# Patient Record
Sex: FEMALE | Race: WHITE | NOT HISPANIC OR LATINO
[De-identification: names, ages, dates, MRNs, and addresses within clinical notes are randomized per-mention and may not be internally consistent; named-entity substitution may affect disease eponyms.]

---

## 2021-05-26 ENCOUNTER — NON-APPOINTMENT (OUTPATIENT)
Age: 71
End: 2021-05-26

## 2021-05-28 ENCOUNTER — NON-APPOINTMENT (OUTPATIENT)
Age: 71
End: 2021-05-28

## 2021-06-01 ENCOUNTER — NON-APPOINTMENT (OUTPATIENT)
Age: 71
End: 2021-06-01

## 2021-06-02 ENCOUNTER — NON-APPOINTMENT (OUTPATIENT)
Age: 71
End: 2021-06-02

## 2021-06-02 ENCOUNTER — APPOINTMENT (OUTPATIENT)
Dept: BREAST CENTER | Facility: CLINIC | Age: 71
End: 2021-06-02
Payer: MEDICARE

## 2021-06-02 DIAGNOSIS — Z86.79 PERSONAL HISTORY OF OTHER DISEASES OF THE CIRCULATORY SYSTEM: ICD-10-CM

## 2021-06-02 DIAGNOSIS — Z87.39 PERSONAL HISTORY OF OTHER DISEASES OF THE MUSCULOSKELETAL SYSTEM AND CONNECTIVE TISSUE: ICD-10-CM

## 2021-06-02 DIAGNOSIS — Z86.69 PERSONAL HISTORY OF OTHER DISEASES OF THE NERVOUS SYSTEM AND SENSE ORGANS: ICD-10-CM

## 2021-06-02 DIAGNOSIS — Z87.891 PERSONAL HISTORY OF NICOTINE DEPENDENCE: ICD-10-CM

## 2021-06-02 DIAGNOSIS — Z78.9 OTHER SPECIFIED HEALTH STATUS: ICD-10-CM

## 2021-06-02 DIAGNOSIS — Z86.39 PERSONAL HISTORY OF OTHER ENDOCRINE, NUTRITIONAL AND METABOLIC DISEASE: ICD-10-CM

## 2021-06-02 DIAGNOSIS — H04.552 ACQUIRED STENOSIS OF LEFT NASOLACRIMAL DUCT: ICD-10-CM

## 2021-06-02 PROCEDURE — 99204 OFFICE O/P NEW MOD 45 MIN: CPT

## 2021-06-02 PROCEDURE — 76642 ULTRASOUND BREAST LIMITED: CPT

## 2021-06-02 RX ORDER — BENAZEPRIL HYDROCHLORIDE 5 MG/1
TABLET ORAL
Refills: 0 | Status: ACTIVE | COMMUNITY

## 2021-06-02 RX ORDER — AMLODIPINE BESYLATE 5 MG/1
5 TABLET ORAL
Refills: 0 | Status: ACTIVE | COMMUNITY

## 2021-06-02 RX ORDER — ROSUVASTATIN CALCIUM 10 MG/1
10 TABLET, FILM COATED ORAL
Refills: 0 | Status: ACTIVE | COMMUNITY

## 2021-06-02 RX ORDER — ASPIRIN 81 MG
81 TABLET, DELAYED RELEASE (ENTERIC COATED) ORAL
Refills: 0 | Status: ACTIVE | COMMUNITY

## 2021-06-02 RX ORDER — UBIDECARENONE/VIT E ACET 100MG-5
CAPSULE ORAL
Refills: 0 | Status: ACTIVE | COMMUNITY

## 2021-06-06 NOTE — ASSESSMENT
[FreeTextEntry1] : Right nipple discharge \par Given the benign appearing bedside ultrasound, discussed recommendation to follow up in 3 months for re-evaluation. Also discussed a trial of Salonpas for relief of burning sensation. \par Reviewed the indications to follow up sooner including if nipple discharge becomes clear or bloody in appearance

## 2021-06-06 NOTE — PAST MEDICAL HISTORY
[Menarche Age ____] : age at menarche was [unfilled] [Menopause Age____] : age at menopause was [unfilled] [Approximately ___] : the LMP was approximately [unfilled] [Total Preg ___] : G[unfilled] [Live Births ___] : P[unfilled]  [Living ___] : Living: [unfilled] [Age At Live Birth ___] : Age at live birth: [unfilled] [History of Hormone Replacement Treatment] : has no history of hormone replacement treatment [FreeTextEntry6] : No [FreeTextEntry7] : None [FreeTextEntry8] : 6-9 months and 9-12 months

## 2021-06-06 NOTE — DATA REVIEWED
[FreeTextEntry1] : 12/29/2020 (R) b/l screening mammogram showing scattered areas of fibroglandular density.\par No suspicious masses, architectural distortion, or significant calcifications are detected. Scattered morphologically benign appearing calcifications are present in both breasts. Benign findings. No mammographic evidence of malignancy. No significant change. Annual screening. BI-RADS Category 2: Benign.\par

## 2021-06-06 NOTE — PROCEDURE
[FreeTextEntry1] : targeted US of right breast [FreeTextEntry2] : right nipple discharge [FreeTextEntry3] : Technique: a targeted right breast ultrasound was performed with evaluation of the retroareolar region Findings: No suspicious solid or complex cystic masses were detected \par \par \par

## 2021-06-06 NOTE — REASON FOR VISIT
[Consultation] : a consultation visit [FreeTextEntry1] : spontaneous "watery white" right nipple discharge first noted in December 2020/January 2021 and burning sensation of right NAC

## 2021-06-06 NOTE — CONSULT LETTER
[Dear  ___] : Dear ~JOHN, [Consult Letter:] : I had the pleasure of evaluating your patient, [unfilled]. [Please see my note below.] : Please see my note below. [Consult Closing:] : Thank you very much for allowing me to participate in the care of this patient.  If you have any questions, please do not hesitate to contact me. [Sincerely,] : Sincerely, [FreeTextEntry3] : Denzel\par \par Denzel Ceballos MD\par Chief of Breast Surgery\par Director of Breast Cancer Program\par [facility]\par \par \par

## 2021-09-02 PROBLEM — Z00.00 ENCOUNTER FOR PREVENTIVE HEALTH EXAMINATION: Status: ACTIVE | Noted: 2021-05-26

## 2021-09-02 PROBLEM — N64.4 MASTALGIA: Status: ACTIVE | Noted: 2021-06-02

## 2021-09-08 ENCOUNTER — APPOINTMENT (OUTPATIENT)
Dept: BREAST CENTER | Facility: CLINIC | Age: 71
End: 2021-09-08
Payer: MEDICARE

## 2021-09-08 VITALS — OXYGEN SATURATION: 98 % | HEART RATE: 67 BPM | BODY MASS INDEX: 32.47 KG/M2 | WEIGHT: 172 LBS | HEIGHT: 61 IN

## 2021-09-08 DIAGNOSIS — Z00.00 ENCOUNTER FOR GENERAL ADULT MEDICAL EXAMINATION W/OUT ABNORMAL FINDINGS: ICD-10-CM

## 2021-09-08 DIAGNOSIS — N64.4 MASTODYNIA: ICD-10-CM

## 2021-09-08 PROCEDURE — 99213 OFFICE O/P EST LOW 20 MIN: CPT

## 2021-09-12 NOTE — ASSESSMENT
[FreeTextEntry1] : 74yo female LOV 6/2/21 presents for f/u evaluation of burning sensation of NAC first noticed in May 2021 following her breast exam with Dr. Manley. In office US at initial office visit did not show any concerning findings.  \par \par Patient to have diagnostic bilateral mammo and ultrasound now. If benign, she will return in 6 months for re-examination.

## 2021-09-12 NOTE — PHYSICAL EXAM
[Examined in the supine and seated position] : examined in the supine and seated position [Symmetrical] : symmetrical [No dominant masses] : no dominant masses in right breast  [No dominant masses] : no dominant masses left breast [No Nipple Retraction] : no left nipple retraction [No Nipple Discharge] : no left nipple discharge [No Axillary Lymphadenopathy] : no left axillary lymphadenopathy [de-identified] : Bilateral breast/axilla/supraclavicular area: No masses, discharge, or adenopathy\par

## 2021-09-12 NOTE — HISTORY OF PRESENT ILLNESS
[FreeTextEntry1] : 74yo female LOV 6/2/21 presents for f/u evaluation of burning sensation of NAC first noticed in May 2021 following her breast exam with Dr. Manley. In office US at initial office visit did not show any concerning findings.  \par \par At initial consultation, patient reported spontaneous "watery white" right nipple discharge first noted in December 2020/January 2021, continuous.  She has family history of breast cancer, her niece was diagnosed at the age of 43 (brother's daughter). Denies palpable abnormalities, no skin changes/dimpling bilaterally. \par \par She uses 10 mcg suppository Vagifem. \par \par NELLY lifetime risk of 2.7%. \par \par \par \par \par \par

## 2021-10-12 ENCOUNTER — NON-APPOINTMENT (OUTPATIENT)
Age: 71
End: 2021-10-12

## 2022-01-04 ENCOUNTER — NON-APPOINTMENT (OUTPATIENT)
Age: 72
End: 2022-01-04

## 2022-03-07 ENCOUNTER — NON-APPOINTMENT (OUTPATIENT)
Age: 72
End: 2022-03-07

## 2022-03-09 ENCOUNTER — APPOINTMENT (OUTPATIENT)
Dept: BREAST CENTER | Facility: CLINIC | Age: 72
End: 2022-03-09
Payer: MEDICARE

## 2022-03-09 VITALS — WEIGHT: 170 LBS | OXYGEN SATURATION: 96 % | BODY MASS INDEX: 32.1 KG/M2 | HEIGHT: 61 IN | HEART RATE: 81 BPM

## 2022-03-09 PROCEDURE — 99213 OFFICE O/P EST LOW 20 MIN: CPT

## 2022-03-09 NOTE — DATA REVIEWED
[FreeTextEntry1] : 12/29/2020 (R) b/l screening mammogram showing scattered areas of fibroglandular density.\par No suspicious masses, architectural distortion, or significant calcifications are detected. Scattered morphologically benign appearing calcifications are present in both breasts. Benign findings. No mammographic evidence of malignancy. No significant change. Annual screening. BI-RADS Category 2: Benign.\par \par 9/29/2021 ([facility]) Right DX MMG/US: There are scattered calcifications in the central right breast and upper outer quadrant of the right breast which mostly appear to layer dependently on the 90 degree lateral views, consistent with benign milk of calcium. IMPRESSION: Right breast calcifications are probably benign and should be followed up with diagnostic right mammogram in 6 months. Right nipple discharge is without a mammographic or sonographic correlate. Bilateral breast MRI with and without intravenous contrast is recommended. Scattered density. FOLLOW-UP: Breast MRI. BI-RADS Category 0: Incomplete.\par \par 10/6/2021 ([facility]) B/L MRI: No MR evidence of malignancy. Specifically, there is no suspicious enhancement in the right breast to explain patient's symptoms. Further management of a palpable finding should be based on clinical grounds. Patient will be due for short-term mammographic follow-up of the right breast in March 2022, according to diagnostic workup recommendations dated 9/29/2021. FOLLOW-UP: Clinical correlation and assessment. BI-RADS Category 1: Negative\par \par 12/31/2021 (R) B/L sMMG: No suspicious masses, architectural distortion, or significant calcifications are detected. Arterial vascular calcifications are present. Scattered density. FOLLOW-UP: Annual screening. BI-RADS Category 1: Negative\par

## 2022-03-09 NOTE — PHYSICAL EXAM
[Examined in the supine and seated position] : examined in the supine and seated position [Symmetrical] : symmetrical [No dominant masses] : no dominant masses in right breast  [No dominant masses] : no dominant masses left breast [No Nipple Retraction] : no left nipple retraction [No Nipple Discharge] : no left nipple discharge [No Axillary Lymphadenopathy] : no left axillary lymphadenopathy [Supple] : supple [No Supraclavicular Adenopathy] : no supraclavicular adenopathy [No Rashes] : no rashes [No Ulceration] : no ulceration [de-identified] : Bilateral breast/axilla/supraclavicular area: No masses, discharge, or adenopathy\par

## 2022-03-09 NOTE — HISTORY OF PRESENT ILLNESS
[FreeTextEntry1] : 74yo female presents for follow-up evaluation of burning sensation of NAC first noticed in May 2021 following her breast exam with Dr. Manley. At initial consultation, patient reported spontaneous "watery white" right nipple discharge first noted in December 2020/January 2021, continuous.  Patient reports self-resolution of spontaneous right nipple discharge, no episodes since June 2021. Patient reports mild nipple tenderness of right breast. Denies palpable abnormalities, no skin changes/dimpling, nipple discharge bilaterally. \par \par She has family history of breast cancer, her niece was diagnosed at the age of 43, DOD at age 46 (brother's daughter). \par \par NELLY lifetime risk of 2.7%. \par \par \par \par \par \par \par

## 2022-03-09 NOTE — ASSESSMENT
[FreeTextEntry1] : 72yo female presents for follow up evaluation of burning sensation of NAC first noticed in May 2021 following her breast exam with Dr. Manley, since resolved. Patient reports mild nipple tenderness of right breast due to chafing, recommended patient to try applying Aquaphor.  Patient will be due for screening bilateral MMG and reexamination in December 2022. Patient verbalizes understanding and agreement with the plan.

## 2022-03-09 NOTE — REASON FOR VISIT
[Follow-Up: _____] : a [unfilled] follow-up visit [FreeTextEntry1] : follow up nipple discharge and annual screening

## 2022-06-06 ENCOUNTER — NON-APPOINTMENT (OUTPATIENT)
Age: 72
End: 2022-06-06

## 2023-02-08 ENCOUNTER — APPOINTMENT (OUTPATIENT)
Dept: BREAST CENTER | Facility: CLINIC | Age: 73
End: 2023-02-08

## 2023-08-02 ENCOUNTER — APPOINTMENT (OUTPATIENT)
Dept: BREAST CENTER | Facility: CLINIC | Age: 73
End: 2023-08-02
Payer: MEDICARE

## 2023-08-02 VITALS
HEIGHT: 61 IN | BODY MASS INDEX: 30.58 KG/M2 | HEART RATE: 74 BPM | SYSTOLIC BLOOD PRESSURE: 127 MMHG | WEIGHT: 162 LBS | OXYGEN SATURATION: 95 % | DIASTOLIC BLOOD PRESSURE: 68 MMHG

## 2023-08-02 DIAGNOSIS — Z80.3 FAMILY HISTORY OF MALIGNANT NEOPLASM OF BREAST: ICD-10-CM

## 2023-08-02 PROCEDURE — 99213 OFFICE O/P EST LOW 20 MIN: CPT

## 2023-08-02 RX ORDER — EZETIMIBE 10 MG/1
10 TABLET ORAL
Refills: 0 | Status: ACTIVE | COMMUNITY

## 2023-08-02 NOTE — HISTORY OF PRESENT ILLNESS
[FreeTextEntry1] : 72yo female presents for follow-up evaluation of burning sensation of NAC first noticed in May 2021 following her breast exam with Dr. Manley. At initial consultation, patient reported spontaneous "watery white" right nipple discharge first noted in December 2020/January 2021, continuous.  Patient reports self-resolution of spontaneous right nipple discharge, no episodes since June 2021. Patient reports mild nipple tenderness of right breast, recommended trial of Aquaphor at LOV .   Denies palpable abnormalities, no skin changes/dimpling, nipple discharge bilaterally.  B/L sMMG 1/23/23 BIRADS-2.  She has family history of breast cancer, her niece was diagnosed at the age of 43, DOD at age 46 (brother's daughter).  NELLY lifetime risk of 2.7%.

## 2023-08-02 NOTE — ASSESSMENT
[FreeTextEntry1] : 74yo female presents for follow up evaluation of burning sensation of NAC first noticed in May 2021 following her breast exam with Dr. Manley, since resolved. Patient reports mild nipple tenderness of right breast due to chafing.  Physical exam WNL. Most recent imaging reviewed, B/L sMMG 1/23/23, BIRADS-2. Plan for B/L sMMG and re-examination in January 2024. Patient may get future imaging at [facility] as she lives in [place] and  is ill and travel is hard. Patient verbalizes understanding and agreement with the plan.

## 2023-08-02 NOTE — DATA REVIEWED
[FreeTextEntry1] : 12/29/2020 (R) b/l screening mammogram showing scattered areas of fibroglandular density. No suspicious masses, architectural distortion, or significant calcifications are detected. Scattered morphologically benign appearing calcifications are present in both breasts. Benign findings. No mammographic evidence of malignancy. No significant change. Annual screening. BI-RADS Category 2: Benign.  9/29/2021 ([facility]) Right DX MMG/US: There are scattered calcifications in the central right breast and upper outer quadrant of the right breast which mostly appear to layer dependently on the 90 degree lateral views, consistent with benign milk of calcium. IMPRESSION: Right breast calcifications are probably benign and should be followed up with diagnostic right mammogram in 6 months. Right nipple discharge is without a mammographic or sonographic correlate. Bilateral breast MRI with and without intravenous contrast is recommended. Scattered density. FOLLOW-UP: Breast MRI. BI-RADS Category 0: Incomplete.  10/6/2021 ([facility]) B/L MRI: No MR evidence of malignancy. Specifically, there is no suspicious enhancement in the right breast to explain patient's symptoms. Further management of a palpable finding should be based on clinical grounds. Patient will be due for short-term mammographic follow-up of the right breast in March 2022, according to diagnostic workup recommendations dated 9/29/2021. FOLLOW-UP: Clinical correlation and assessment. BI-RADS Category 1: Negative  12/31/2021 ([facility]) B/L sMMG: No suspicious masses, architectural distortion, or significant calcifications are detected. Arterial vascular calcifications are present. Scattered density. FOLLOW-UP: Annual screening. BI-RADS Category 1: Negative  1/23/23 ([facility]) B/L sMMG: scattered areas of fbg density. There is a grouping of amorphous microcalcifications in the central right breast which is mammographically stable.  No findings suspicious for malignancy. FOLLOW-UP: Follow-up imaging in 12 months. BI-RADS 2:  Benign.

## 2023-08-02 NOTE — PHYSICAL EXAM
[Supple] : supple [No Supraclavicular Adenopathy] : no supraclavicular adenopathy [Examined in the supine and seated position] : examined in the supine and seated position [Symmetrical] : symmetrical [No dominant masses] : no dominant masses in right breast  [No dominant masses] : no dominant masses left breast [No Nipple Retraction] : no left nipple retraction [No Nipple Discharge] : no left nipple discharge [No Axillary Lymphadenopathy] : no left axillary lymphadenopathy [No Rashes] : no rashes [No Ulceration] : no ulceration [de-identified] : Bilateral breast/axilla/supraclavicular area: No masses, discharge, or adenopathy\par

## 2024-02-28 ENCOUNTER — APPOINTMENT (OUTPATIENT)
Dept: BREAST CENTER | Facility: CLINIC | Age: 74
End: 2024-02-28

## 2024-03-04 ENCOUNTER — NON-APPOINTMENT (OUTPATIENT)
Age: 74
End: 2024-03-04

## 2024-03-11 ENCOUNTER — NON-APPOINTMENT (OUTPATIENT)
Age: 74
End: 2024-03-11

## 2024-03-12 PROBLEM — Z12.39 BREAST SCREENING: Status: ACTIVE | Noted: 2021-09-02

## 2024-03-15 ENCOUNTER — APPOINTMENT (OUTPATIENT)
Dept: BREAST CENTER | Facility: CLINIC | Age: 74
End: 2024-03-15
Payer: MEDICARE

## 2024-03-15 VITALS
WEIGHT: 167 LBS | BODY MASS INDEX: 30.73 KG/M2 | HEIGHT: 62 IN | DIASTOLIC BLOOD PRESSURE: 62 MMHG | SYSTOLIC BLOOD PRESSURE: 137 MMHG | HEART RATE: 62 BPM

## 2024-03-15 DIAGNOSIS — Z12.39 ENCOUNTER FOR OTHER SCREENING FOR MALIGNANT NEOPLASM OF BREAST: ICD-10-CM

## 2024-03-15 DIAGNOSIS — N64.52 NIPPLE DISCHARGE: ICD-10-CM

## 2024-03-15 DIAGNOSIS — Z80.3 FAMILY HISTORY OF MALIGNANT NEOPLASM OF BREAST: ICD-10-CM

## 2024-03-15 PROCEDURE — 99213 OFFICE O/P EST LOW 20 MIN: CPT

## 2024-03-15 RX ORDER — EZETIMIBE 10 MG/1
10 TABLET ORAL
Refills: 0 | Status: ACTIVE | COMMUNITY

## 2024-03-15 NOTE — PAST MEDICAL HISTORY
[Menarche Age ____] : age at menarche was [unfilled] [Menopause Age____] : age at menopause was [unfilled] [Total Preg ___] : G[unfilled] [Approximately ___] : the LMP was approximately [unfilled] [Living ___] : Living: [unfilled] [Live Births ___] : P[unfilled]  [Age At Live Birth ___] : Age at live birth: [unfilled] [History of Hormone Replacement Treatment] : has no history of hormone replacement treatment [FreeTextEntry6] : No [FreeTextEntry7] : None [FreeTextEntry8] : 6-9 months and 9-12 months

## 2024-03-15 NOTE — HISTORY OF PRESENT ILLNESS
[FreeTextEntry1] : 74yo female presents for follow-up for breast cancer screening. Patient with history of burning sensation of NAC first noticed in May 2021 following her breast exam with Dr. Manley. At initial consultation, patient reported spontaneous "watery white" right nipple discharge first noted in December 2020/January 2021, continuous.  Patient reports self-resolution of spontaneous right nipple discharge, no episodes since June 2021.   Denies palpable abnormalities, no skin changes/dimpling, nipple discharge bilaterally.  B/L sMMG 3/9/24 BIRADS-2   She has family history of breast cancer, her niece was diagnosed at the age of 43, DOD at age 46 (brother's daughter).  NELLY lifetime risk of 2.7%.

## 2024-03-15 NOTE — ASSESSMENT
[FreeTextEntry1] : 74yo female presents for follow up for breast cancer screening with history of burning sensation of NAC first noticed in May 2021 following her breast exam with Dr. Manley, since resolved. Patient reports mild nipple tenderness of right breast due to chafing.  Physical exam WNL. Most recent imaging reviewed, B/L sMMG 3/9/24, BIRADS-2, with note that arterial vascular calcifications are present - patient reports her cardiologist is aware, report provided to patient. Plan for B/L sMMG and re-examination in March 2025. Patient verbalizes understanding and agreement with the plan.

## 2024-03-15 NOTE — PHYSICAL EXAM
[Supple] : supple [Examined in the supine and seated position] : examined in the supine and seated position [No Supraclavicular Adenopathy] : no supraclavicular adenopathy [Symmetrical] : symmetrical [No dominant masses] : no dominant masses in right breast  [No dominant masses] : no dominant masses left breast [No Nipple Retraction] : no left nipple retraction [No Nipple Discharge] : no right nipple discharge [No Axillary Lymphadenopathy] : no left axillary lymphadenopathy [No Ulceration] : no ulceration [No Rashes] : no rashes [de-identified] : Bilateral breast/axilla/supraclavicular area: No masses, discharge, or adenopathy\par

## 2025-03-28 ENCOUNTER — APPOINTMENT (OUTPATIENT)
Dept: BREAST CENTER | Facility: CLINIC | Age: 75
End: 2025-03-28

## 2025-04-24 ENCOUNTER — NON-APPOINTMENT (OUTPATIENT)
Age: 75
End: 2025-04-24

## 2025-05-02 ENCOUNTER — APPOINTMENT (OUTPATIENT)
Dept: BREAST CENTER | Facility: CLINIC | Age: 75
End: 2025-05-02

## 2025-06-04 ENCOUNTER — NON-APPOINTMENT (OUTPATIENT)
Age: 75
End: 2025-06-04

## 2025-06-04 ENCOUNTER — APPOINTMENT (OUTPATIENT)
Dept: BREAST CENTER | Facility: CLINIC | Age: 75
End: 2025-06-04

## 2025-06-04 DIAGNOSIS — Z80.3 FAMILY HISTORY OF MALIGNANT NEOPLASM OF BREAST: ICD-10-CM

## 2025-06-04 DIAGNOSIS — Z12.39 ENCOUNTER FOR OTHER SCREENING FOR MALIGNANT NEOPLASM OF BREAST: ICD-10-CM

## 2025-06-04 PROCEDURE — 99213 OFFICE O/P EST LOW 20 MIN: CPT

## 2025-06-04 RX ORDER — ESTRADIOL 10 UG/1
10 INSERT VAGINAL
Refills: 0 | Status: ACTIVE | COMMUNITY

## 2025-06-04 RX ORDER — ROSUVASTATIN CALCIUM 40 MG/1
40 TABLET, FILM COATED ORAL
Refills: 0 | Status: ACTIVE | COMMUNITY